# Patient Record
(demographics unavailable — no encounter records)

---

## 2025-03-06 NOTE — DISCUSSION/SUMMARY
[FreeTextEntry1] : The patient is sedentary but asymptomatic.  She will attempt to increase her exercise.  She is planning breast reconstruction but it is not been scheduled yet.  EKG shows an atrial rhythm, left atrial enlargement and right bundle branch block.  There is no evidence for ischemia.  She has a history of hypercholesterolemia.  Laboratory studies were drawn and will be reviewed.  She will return for preoperative evaluation. [EKG obtained to assist in diagnosis and management of assessed problem(s)] : EKG obtained to assist in diagnosis and management of assessed problem(s)

## 2025-07-07 NOTE — DISCUSSION/SUMMARY
[FreeTextEntry1] : The patient has a history of hypertension and pulmonary embolism.  She is deconditioned but her exercise tolerance is stable.  Her EKG shows an atrial rhythm with ventricular pacing.  She is having a low risk procedure.  Her RCRI score is 0.  She is low risk.  She is optimized for the procedure.

## 2025-07-07 NOTE — HISTORY OF PRESENT ILLNESS
[FreeTextEntry1] : bilateral breast reconstruction at [facility] with Dr. Mary Brown. walk 4 with rollator with 2-3 stops, mildly worse. dizzy.

## 2025-07-07 NOTE — HISTORY OF PRESENT ILLNESS
[FreeTextEntry1] : bilateral breast reconstruction at [facility] with Dr. Mary Brown. walk 4 with rollator with 2-3 stops, mildly worse. dizzy.